# Patient Record
Sex: MALE | Race: ASIAN
[De-identification: names, ages, dates, MRNs, and addresses within clinical notes are randomized per-mention and may not be internally consistent; named-entity substitution may affect disease eponyms.]

---

## 2019-02-14 ENCOUNTER — HOSPITAL ENCOUNTER (EMERGENCY)
Dept: HOSPITAL 74 - FER | Age: 69
Discharge: HOME | End: 2019-02-14
Payer: COMMERCIAL

## 2019-02-14 VITALS — TEMPERATURE: 98.7 F | HEART RATE: 100 BPM

## 2019-02-14 VITALS — BODY MASS INDEX: 26.2 KG/M2

## 2019-02-14 VITALS — SYSTOLIC BLOOD PRESSURE: 160 MMHG | DIASTOLIC BLOOD PRESSURE: 100 MMHG

## 2019-02-14 DIAGNOSIS — J09.X2: Primary | ICD-10-CM

## 2019-02-14 DIAGNOSIS — R91.1: ICD-10-CM

## 2019-02-14 NOTE — PDOC
History of Present Illness





- General


Chief Complaint: Cold Symptoms


Stated Complaint: COLD  HEADACHE  COUGH


Time Seen by Provider: 02/14/19 09:50


History Source: Patient


Exam Limitations: No Limitations





- History of Present Illness


Initial Comments: 


Pt is a 69 yo M, with PMH of HTN, HLD, b/l hernia repair, and obstructive 

prostate/TURP, who is presenting with complaints of dry cough, mild frontal 

headache, and subjective chills since yesterday morning when he woke up. The pt 

states his daughter had similar symptoms last week and was treated with 

Zithromax. Pt took Tylenol yesterday throughout the day which helped with his 

symptoms. Pt has not contacted his PCP for this issue as "they did not have 

appointment times". Pt denies any vision changes, syncope, chest pain, 

palpitations, SOB, nausea/vomiting, abdominal pain, urinary symptoms, diarrhea/

constipation, or leg swelling.





Social: Pt denies any cigarette, alcohol, or drug use.


Pt denies any recent travel. Daughter was sick last week with similar symptoms, 

neither received flu shots.


Surgical: obstructive prostate/urinary retention - TURP.


Family: no relevant history.


02/14/19 10:18








Past History





- Travel


Traveled outside of the country in the last 30 days: No


Close contact w/someone who was outside of country & ill: No





- Past Medical History


Allergies/Adverse Reactions: 


 Allergies











Allergy/AdvReac Type Severity Reaction Status Date / Time


 


No Known Allergies Allergy   Verified 02/14/19 09:54











Home Medications: 


Ambulatory Orders





Irbesartan 150 mg PO DAILY 02/14/19 








Anemia: No


Asthma: No


Cancer: No


Cardiac Disorders: No


CVA: No


COPD: No


CHF: No


Dementia: No


Diabetes: No


GI Disorders: No


 Disorders: No


HTN: Yes


Hypercholesterolemia: Yes


Liver Disease: No


Seizures: No


Thyroid Disease: No





- Surgical History


Abdominal Surgery: No


Appendectomy: No


Cardiac Surgery: No


Cholecystectomy: No


Lung Surgery: No


Neurologic Surgery: No


Orthopedic Surgery: No





- Suicide/Smoking/Psychosocial Hx


Smoking History: Never smoked


Have you smoked in the past 12 months: No


Hx Alcohol Use: No


Drug/Substance Use Hx: No


Substance Use Type: None


Hx Substance Use Treatment: No





**Review of Systems





- Review of Systems


Able to Perform ROS?: Yes


Is the patient limited English proficient: No


Constitutional: Yes: Chills (subjective), Loss of Appetite, Weight Stable.  No: 

Diaphoresis, Fever, Malaise, Weakness


HEENTM: No: Recent change in vision, Nose Congestion, Hearing Loss, Throat Pain

, Throat Swelling, Difficulty Swallowing


Respiratory: Yes: Cough, Productive cough.  No: Orthopnea, Shortness of Breath, 

Stridor, Wheezing, Hemoptysis


Cardiac (ROS): No: Chest Pain, Edema, Irregular Heart Rate, Lightheadedness, 

Palpitations, Syncope, Chest Tightness


ABD/GI: Yes: Poor Appetite (since yesterday when symptoms started), Poor Fluid 

Intake.  No: Constipated, Diarrhea, Nausea, Vomiting, Abdominal cramping


: No: Burning, Dysuria, Frequency, Pain, Urgency


Musculoskeletal: No: Back Pain, Joint Pain, Joint Swelling, Muscle Weakness


Integumentary: No: Rash


Neurological: Yes: Headache (mild frontal headache).  No: Weakness, Unsteady 

Gait


Psychiatric: No: Sleep Pattern Change, Change in Appetite


Endocrine: No: Increased Urine, Change in Weight


Hematologic/Lymphatic: No: Anemia, Blood Clots, Easy Bleeding, Easy Bruising


All Other Systems: Reviewed and Negative





*Physical Exam





- Physical Exam


Comments: 


Pt afebrile, but hypertensive (will reassess vitals once pt is more calm, 

appears anxious). Pt in NAD, normal body habitus. PE showed pt alert and 

oriented. CNs generally intact, muscular strength and sensation intact. 

Oropharynx without erythema or exudates. No nasal congestion, hearing intact. 

Clear heart sounds, S1/S2, no JVD, b/l pedal edema, or heart murmur. Clear lung 

sounds, but diminished on the L lung fields, anterior and posterior. No 

respiratory distress, wheezes, crackles, or accessory muscle use. No abdominal 

or CVA tenderness to palpation, no rebound, no guarding. Abdomen soft, non-

distended, and with normoactive bowel sounds. Skin without jaundice or rash. 


02/14/19 10:14








Medical Decision Making





- Medical Decision Making


Pt was seen at bedside, also will be seen by attending Dr. Bob. Pt 

presenting with complaints of dry cough, mild frontal headache, and subjective 

chills since yesterday morning when he woke up. The pt states his daughter had 

similar symptoms last week and was treated with Zithromax. Pt took Tylenol 

yesterday throughout the day which helped with his symptoms. Pt has not 

contacted his PCP for this issue as "they did not have appointment times". Pt 

denies any vision changes, syncope, chest pain, palpitations, SOB, nausea/

vomiting, abdominal pain, urinary symptoms, diarrhea/constipation, or leg 

swelling.





Pt afebrile, but hypertensive (will reassess vitals once pt is more calm, 

appears anxious). Pt in NAD, normal body habitus. PE showed pt alert and 

oriented. CNs generally intact, muscular strength and sensation intact. 

Oropharynx without erythema or exudates. No nasal congestion, hearing intact. 

Clear heart sounds, S1/S2, no JVD, b/l pedal edema, or heart murmur. Clear lung 

sounds, but diminished on the L lung fields, anterior and posterior. No 

respiratory distress, wheezes, crackles, or accessory muscle use. No abdominal 

or CVA tenderness to palpation, no rebound, no guarding. Abdomen soft, non-

distended, and with normoactive bowel sounds. Skin without jaundice or rash. 


Considering pneumonia vs viral URI vs bronchitis vs pleural effusion vs cardiac/

ACS (HTN, new dyspnea)


Ordered work-up including influenza swab, ecg, and chest x-ray.


Provided 650 mg PO tylenol for improvement of discomfort.


Will continue to reassess pt and monitor for symptomatic improvement.


02/14/19 10:11





ECG: NSR, intervals WNL. No TWIs or significant ST segment changes. No 

significant changes from prior ECG.


02/14/19 10:16





Pt being taken for chest x-ray. Tylenol provided.


02/14/19 10:21





[id number] RAD/CHEST PA & LAT


Cough.


Chest x-ray, PA and lateral


No prior is available for comparison.


The cardiac silhouette is within normal limits in size with mild 

atherosclerotic unfolding of the


aortic arch. Mild elevation of the right hemidiaphragm. There are mild 

bilateral increased


interstitial markings, bilaterally. Tiny nodularity in the left mid and lower 

lung which is


nonspecific. There is a 2.2 cm round nodular opacity projecting over the mid 

chest,


anteriorly on the lateral view despite of which is not clear. It may be on the 

left side.


Mediastinum and visualized osseous structures appear intact.


IMPRESSION:


2.2 cm round nodular opacity projecting over the mid chest, anteriorly 

essentially seen on


the lateral view only and for which correlation with CT scan of the chest is 

recommended for


further evaluation





Will order non-contrast CT scan of the chest to get a better view of the 

nodular opacity.


Vital signs improved, /100, RR 12, O2 sat 100% on RA, HR 95


02/14/19 11:02





Pt is Influenza A positive. 


Pending read of CT chest.


02/14/19 13:03





[id number] CT/CHEST CT WITHOUT CONTRAST


INDICATION: Nodularity may chest


TECHNIQUE: Helical images of the chest were obtained noncontrast .


COMPARISON: None


FINDINGS:


LUNGS: A 2 x 2 x 2 cm nodule is identified within the lingula. This nodule soft 

tissue density.


Malignancy not excluded. Dependent atelectasis present at the right base. No 

endobronchial


lesions are seen. Minimal scarring is identified within the right upper lobe. 

No other


suspicious lung nodules seen. No endobronchial lesions are identified.


PLEURA: Negative


MEDIASTINUM: 8 mm AP window lymph node is noted. Gynecomastia.


CARDIAC: The heart is normal in size.


OTHER: Hepatic steatosis.


1.4 cm left adrenal adenoma.


No aggressive bone lesions seen.


IMPRESSION:


Left lung nodule as described above. Malignancy to be excluded. Consider PET/CT.


Additional comments noted above.


Findings communicated to the ED at the time of this dictation.


02/14/19 13:49





Spoke with Dr. Cagle (PCP) -- [phone] -- who will see the pt in clinic. 


Pt provided with copies of chest x-ray and CT scan to take to appointment.


Pt can be discharged to home with follow-up. Pt advised to follow-up with PCP 

in 1-2 days. Strict return precautions provided with pt understanding.


02/14/19 14:25








*DC/Admit/Observation/Transfer


Diagnosis at time of Disposition: 


 Influenza A, Pulmonary nodule








- Discharge Dispostion


Disposition: HOME


Condition at time of disposition: Good


Decision to Admit order: No





- Referrals


Referrals: 


Man Shine MD [Non Staff, Medical] - 





- Patient Instructions


Printed Discharge Instructions:  DI for Influenza -- Adult


Additional Instructions: 


You were seen in the ER today for cough and fever. The results of your labs and 

imaging today showed influenza (the flu). You also had a lung nodule on the 

left side, which should be followed-up by your regular doctor for further 

tests. Please follow-up with your primary care doctor within 1-2 days to 

discuss your visit and make sure your symptoms have improved. Please return to 

the ER if you have any worsening pain, development of fevers or chills, loss of 

consciousness, shortness of breath, inability to tolerate food or fluids, or 

any other concerns.








- Post Discharge Activity

## 2019-02-14 NOTE — PDOC
Attending Attestation





- Resident


Resident Name: Jessica Hernández





- ED Attending Attestation


I have performed the following: I have examined & evaluated the patient, The 

case was reviewed & discussed with the resident, I agree w/resident's findings 

& plan, Exceptions are as noted





- HPI


HPI: 





02/14/19 11:06


67 yo male h/o HTN ( takes meds at night) here with c/o cough congestion, cough 

nonproductive and mild frontal headahce. post tussive headache. no n/v no f/c 

did have a sick contact of a daughter who was sick with uri ( treated with zpak

) one week ago. no recent travel. no flu shot this year. no other complaints.





- Physicial Exam


PE: 





02/14/19 11:08


awake alert lungs clear bilaterally. heart rrr no mrg abd soft nt nd. ext wwp 

no edema. no calf tenderness. skin warm and dry. nuero alert oriented x 3 moves 

all four ext. speech clear. 





- Medical Decision Making





02/14/19 11:09


67 yo male here with cough congestion. differential uri, pna, influenzae. john 

check flu swab cxr. ekg

## 2019-02-15 NOTE — EKG
Test Reason : 

Blood Pressure : ***/*** mmHG

Vent. Rate : 092 BPM     Atrial Rate : 092 BPM

   P-R Int : 176 ms          QRS Dur : 100 ms

    QT Int : 372 ms       P-R-T Axes : 050 -25 054 degrees

   QTc Int : 460 ms

 

NORMAL SINUS RHYTHM

NORMAL ECG

WHEN COMPARED WITH ECG OF 09-MAR-2017 08:28,

NO SIGNIFICANT CHANGE WAS FOUND

Confirmed by KAYCEE CALABRESE MD ([id number]) on 2/15/2019 12:43:40 PM

 

Referred By: PRINCE FAULKNER           Confirmed By:KAYCEE CALABRESE MD

## 2020-04-12 ENCOUNTER — HOSPITAL ENCOUNTER (EMERGENCY)
Dept: HOSPITAL 74 - FER | Age: 70
Discharge: HOME | End: 2020-04-12
Payer: COMMERCIAL

## 2020-04-12 VITALS — HEART RATE: 89 BPM

## 2020-04-12 VITALS — DIASTOLIC BLOOD PRESSURE: 97 MMHG | TEMPERATURE: 98.5 F | SYSTOLIC BLOOD PRESSURE: 168 MMHG

## 2020-04-12 VITALS — BODY MASS INDEX: 25 KG/M2

## 2020-04-12 DIAGNOSIS — N41.9: Primary | ICD-10-CM

## 2020-04-12 LAB
ALBUMIN SERPL-MCNC: 3.7 G/DL (ref 3.4–5)
ALP SERPL-CCNC: 64 U/L (ref 45–117)
ALT SERPL-CCNC: 38 U/L (ref 13–61)
ANION GAP SERPL CALC-SCNC: 10 MMOL/L (ref 8–16)
AST SERPL-CCNC: 28 U/L (ref 15–37)
BASOPHILS # BLD: 0.4 % (ref 0–2)
BILIRUB SERPL-MCNC: 1 MG/DL (ref 0.2–1)
BUN SERPL-MCNC: 13 MG/DL (ref 7–18)
CALCIUM SERPL-MCNC: 8.7 MG/DL (ref 8.5–10)
CHLORIDE SERPL-SCNC: 100 MMOL/L (ref 98–107)
CO2 SERPL-SCNC: 25 MMOL/L (ref 21–32)
CREAT SERPL-MCNC: 1 MG/DL (ref 0.55–1.3)
DEPRECATED RDW RBC AUTO: 14.1 % (ref 11.9–15.9)
EOSINOPHIL # BLD: 0 % (ref 0–4.5)
EPITH CASTS URNS QL MICRO: (no result) /HPF
GLUCOSE SERPL-MCNC: 221 MG/DL (ref 74–106)
HCT VFR BLD CALC: 42.3 % (ref 35.4–49)
HGB BLD-MCNC: 14.4 GM/DL (ref 11.7–16.9)
LYMPHOCYTES # BLD: 12.5 % (ref 8–40)
MCH RBC QN AUTO: 27.5 PG (ref 25.7–33.7)
MCHC RBC AUTO-ENTMCNC: 34.1 G/DL (ref 32–35.9)
MCV RBC: 80.8 FL (ref 80–96)
MONOCYTES # BLD AUTO: 5.7 % (ref 3.8–10.2)
NEUTROPHILS # BLD: 81.4 % (ref 42.8–82.8)
PLATELET # BLD AUTO: 177 K/MM3 (ref 134–434)
PMV BLD: 7.7 FL (ref 7.5–11.1)
POTASSIUM SERPLBLD-SCNC: 4 MMOL/L (ref 3.5–5.1)
PROT SERPL-MCNC: 6.7 G/DL (ref 6.4–8.2)
RBC # BLD AUTO: 5.23 M/MM3 (ref 4–5.6)
SODIUM SERPL-SCNC: 135 MMOL/L (ref 136–145)
WBC # BLD AUTO: 7.9 K/MM3 (ref 4–10.8)

## 2023-01-12 ENCOUNTER — HOSPITAL ENCOUNTER (EMERGENCY)
Dept: HOSPITAL 74 - FER | Age: 73
Discharge: HOME | End: 2023-01-12
Payer: COMMERCIAL

## 2023-01-12 VITALS
RESPIRATION RATE: 16 BRPM | SYSTOLIC BLOOD PRESSURE: 142 MMHG | HEART RATE: 78 BPM | DIASTOLIC BLOOD PRESSURE: 80 MMHG | TEMPERATURE: 98.8 F

## 2023-01-12 VITALS — BODY MASS INDEX: 26.9 KG/M2

## 2023-01-12 DIAGNOSIS — R33.9: Primary | ICD-10-CM
